# Patient Record
Sex: MALE | Race: WHITE
[De-identification: names, ages, dates, MRNs, and addresses within clinical notes are randomized per-mention and may not be internally consistent; named-entity substitution may affect disease eponyms.]

---

## 2018-02-24 ENCOUNTER — HOSPITAL ENCOUNTER (EMERGENCY)
Dept: HOSPITAL 44 - ED | Age: 33
Discharge: HOME | End: 2018-02-24
Payer: SELF-PAY

## 2018-02-24 VITALS — DIASTOLIC BLOOD PRESSURE: 80 MMHG | SYSTOLIC BLOOD PRESSURE: 121 MMHG

## 2018-02-24 DIAGNOSIS — M79.674: Primary | ICD-10-CM

## 2018-02-24 PROCEDURE — 99282 EMERGENCY DEPT VISIT SF MDM: CPT

## 2018-02-24 PROCEDURE — 73630 X-RAY EXAM OF FOOT: CPT

## 2018-02-24 NOTE — DIAGNOSTIC IMAGING REPORT
VIOLA BONILLA 

[facility]

[address] [facility] [address] [address]

 

 

 

 

Report Submission Date: 2018 8:26:50 PM CST

Patient       Study

Name:   ELKIN HERNANDEZ       Date:   2018 8:03:28 PM CST

MRN:   [id number]       Modality Type:   DX

Gender:   M       Description:   LOWER EXTREMITY

:   89       Institution:   [facility]

Physician:   VIOLA BONILLA

     Accession:    [id number]

 

 

Right foot, 3 views 



History: Swelling, pain, attention 1st toe. 



Findings: The osseous, joints and soft tissue structures are normal. 



Impression: Normal.

 

Electronically signed on 2018 8:26:50 PM CST by:

Von SHETH

## 2018-02-24 NOTE — ED PHYSICIAN DOCUMENTATION
Foot Injury





- HISTORIAN


Historian: patient





- HPI


Stated Complaint: right great toe pain 


Chief Complaint: Foot Injury


Onset: hours (6)


Where: home


Severity: mild


Context: other (hit his toe on the box springs )


Further Comments: yes (He states he "stubbed" his toe on the box springs and 

has had swelling and pain)





- ROS


CONST: no problems


CVS/RESP: none


NEURO: denies: headache, dizziness


GI/: denies: nausea, vomiting


MS/SKIN/LYMPH: none





- PAST HX


Past History: none


Immunizations: UTD


Allergies/Adverse Reactions: 


 Allergies











Allergy/AdvReac Type Severity Reaction Status Date / Time


 


No Known Allergies Allergy   Verified 02/24/18 19:49














Home Medications: 


 Ambulatory Orders











 Medication  Instructions  Recorded


 


NK [NK]  02/24/18














- SOCIAL HX


Smoking History: non-smoker


Alcohol Use: none





- FAMILY HX


Family History: none





- VITAL SIGNS


Vital Signs: 





 Vital Signs











Temp Pulse Resp BP Pulse Ox


 


          122/70    


 


          03/08/16 17:54   














- REVIEWED ASSESSMENTS


Nursing Assessment  Reviewed: Yes


Vitals Reviewed: Yes





ED Results Lab/Radiology





- Radiology


Radiology Impressions: 


Right foot, 3 views 





History: Swelling, pain, attention 1st toe. 





Findings: The osseous, joints and soft tissue structures are normal. 





Impression: Normal. 


Electronically signed on Feb 24, 2018 8:26:50 PM CST by: 


Von Greco








- Orders


Orders: 





 ED Orders











 Category Date Time Status


 


 XRAY FOOT [FOOT 3 VIEWS OR MORE] [RAD] Stat Exams  02/24/18 Ordered














Foot Injury Physical Exam





- Physical Exam


General Appearance: no acute distress, alert


Foot: right foot: bone tenderness, deformity, limited range of motion, pain, 

soft tissue tenderness, swelling, left foot: non-tender, normal inspection, 

normal range of motion, no evidence of injury, abrasions/lacerations, N/A: nail 

injury, nodule


Ankle: N/A: non-tender, normal inspection, normal range of motion, no evidence 

of injury, abrasions/laceration, bone tenderness, deformity, ecchymosis, joint 

effusion, limited range of motion, nodules, pain, soft tissue tenderness, 

swelling, other


Gait: limited by pain


Neuro: sensation nml, motor nml


Vascular: no vascular compromise


Tendons: tendon function nml


Leg/Knee/Thigh: uninjured above ankle


Skin: intact, warm, other (along great toe swelling and bruising )


Head/ENT: nml inspection


Neck/Back: nml inspection


Resp/CVS: chest non-tender, breath sounds nml, heart sounds nml, no resp. 

distress, lungs clear, reg. rate & rhythm





Discharge


Clincal Impression: 


 Right foot pain





Referrals: 


Primary Doctor,No [Primary Care Provider] - 2 Days


Comments: 





1. Keep foot elevated


2. Ice


3. Ibuprofen as needed for pain 


4. Rest 


Condition: Stable


Disposition: 01 HOME, SELF-CARE


Decision to Admit: NO


Date of Decison to Admit: 02/24/18


Decision Time: 20:35